# Patient Record
Sex: MALE | Race: WHITE | NOT HISPANIC OR LATINO | URBAN - METROPOLITAN AREA
[De-identification: names, ages, dates, MRNs, and addresses within clinical notes are randomized per-mention and may not be internally consistent; named-entity substitution may affect disease eponyms.]

---

## 2017-02-27 ENCOUNTER — OUTPATIENT (OUTPATIENT)
Dept: OUTPATIENT SERVICES | Facility: HOSPITAL | Age: 47
LOS: 1 days | Discharge: HOME | End: 2017-02-27
Payer: COMMERCIAL

## 2017-02-27 VITALS
DIASTOLIC BLOOD PRESSURE: 69 MMHG | RESPIRATION RATE: 20 BRPM | TEMPERATURE: 97 F | HEART RATE: 55 BPM | OXYGEN SATURATION: 96 % | SYSTOLIC BLOOD PRESSURE: 139 MMHG

## 2017-02-27 DIAGNOSIS — R94.39 ABNORMAL RESULT OF OTHER CARDIOVASCULAR FUNCTION STUDY: ICD-10-CM

## 2017-02-27 LAB
BUN SERPL-MCNC: 6 MG/DL — LOW (ref 7–23)
CALCIUM SERPL-MCNC: 9.2 MG/DL — SIGNIFICANT CHANGE UP (ref 8.4–10.5)
CHLORIDE SERPL-SCNC: 103 MMOL/L — SIGNIFICANT CHANGE UP (ref 98–107)
CO2 SERPL-SCNC: 26 MMOL/L — SIGNIFICANT CHANGE UP (ref 22–31)
CREAT SERPL-MCNC: 0.91 MG/DL — SIGNIFICANT CHANGE UP (ref 0.5–1.3)
GLUCOSE SERPL-MCNC: 103 MG/DL — HIGH (ref 70–99)
HBA1C BLD-MCNC: 6 % — HIGH (ref 4–5.6)
HCT VFR BLD CALC: 49.9 % — SIGNIFICANT CHANGE UP (ref 39–50)
HGB BLD-MCNC: 16.9 G/DL — SIGNIFICANT CHANGE UP (ref 13–17)
MCHC RBC-ENTMCNC: 31.2 PG — SIGNIFICANT CHANGE UP (ref 27–34)
MCHC RBC-ENTMCNC: 33.9 % — SIGNIFICANT CHANGE UP (ref 32–36)
MCV RBC AUTO: 92.2 FL — SIGNIFICANT CHANGE UP (ref 80–100)
PLATELET # BLD AUTO: 277 K/UL — SIGNIFICANT CHANGE UP (ref 150–400)
PMV BLD: 10.3 FL — SIGNIFICANT CHANGE UP (ref 7–13)
POTASSIUM SERPL-MCNC: 4.3 MMOL/L — SIGNIFICANT CHANGE UP (ref 3.5–5.3)
POTASSIUM SERPL-SCNC: 4.3 MMOL/L — SIGNIFICANT CHANGE UP (ref 3.5–5.3)
RBC # BLD: 5.41 M/UL — SIGNIFICANT CHANGE UP (ref 4.2–5.8)
RBC # FLD: 14.2 % — SIGNIFICANT CHANGE UP (ref 10.3–14.5)
SODIUM SERPL-SCNC: 140 MMOL/L — SIGNIFICANT CHANGE UP (ref 135–145)
WBC # BLD: 6.81 K/UL — SIGNIFICANT CHANGE UP (ref 3.8–10.5)
WBC # FLD AUTO: 6.81 K/UL — SIGNIFICANT CHANGE UP (ref 3.8–10.5)

## 2017-02-27 PROCEDURE — 93458 L HRT ARTERY/VENTRICLE ANGIO: CPT | Mod: 26

## 2017-02-27 PROCEDURE — 93010 ELECTROCARDIOGRAM REPORT: CPT

## 2017-02-27 RX ORDER — ATORVASTATIN CALCIUM 80 MG/1
1 TABLET, FILM COATED ORAL
Qty: 30 | Refills: 0 | OUTPATIENT
Start: 2017-02-27 | End: 2017-03-29

## 2017-02-27 RX ORDER — ASPIRIN/CALCIUM CARB/MAGNESIUM 324 MG
1 TABLET ORAL
Qty: 0 | Refills: 0 | COMMUNITY

## 2017-02-27 RX ORDER — SODIUM CHLORIDE 9 MG/ML
500 INJECTION INTRAMUSCULAR; INTRAVENOUS; SUBCUTANEOUS
Qty: 0 | Refills: 0 | Status: DISCONTINUED | OUTPATIENT
Start: 2017-02-27 | End: 2017-03-14

## 2017-02-27 RX ORDER — METOPROLOL TARTRATE 50 MG
1 TABLET ORAL
Qty: 0 | Refills: 0 | COMMUNITY

## 2017-02-27 RX ORDER — SODIUM CHLORIDE 9 MG/ML
3 INJECTION INTRAMUSCULAR; INTRAVENOUS; SUBCUTANEOUS EVERY 8 HOURS
Qty: 0 | Refills: 0 | Status: DISCONTINUED | OUTPATIENT
Start: 2017-02-27 | End: 2017-03-14

## 2017-02-27 RX ADMIN — SODIUM CHLORIDE 100 MILLILITER(S): 9 INJECTION INTRAMUSCULAR; INTRAVENOUS; SUBCUTANEOUS at 12:42

## 2017-02-27 NOTE — H&P CARDIOLOGY - HISTORY OF PRESENT ILLNESS
46 y.o.male presents today for elective cardiac catheterization. The patient c/o midsternal chest pain, felt like "chest tightness". Admits to fever, SOB. Denies palpitations, dizziness, b/l lower extremities edema. The patient went to urgent care, was diagnosed with bronchitis, started on anbx, also  was found to have abnormal ECG, was referred to a cardiologist, was found to have abnormal stress test, normal Echo. The patient was started on Metoprolol and was referred for cardiac catheterization. The patient finished course of antbs and claims that since that time he didn't have the recurrence of symptoms. The patient denies any complaints at present. 46 y.o.male presents today for elective cardiac catheterization. The patient c/o midsternal chest pain, felt like "chest tightness". Admits to fever, SOB. Denies palpitations, dizziness, b/l lower extremities edema. The patient went to urgent care, was diagnosed with bronchitis, started on anbx, also  was found to have abnormal ECG, was referred to a cardiologist, was found to have abnormal stress test, normal Echo. The patient was started on Metoprolol and was referred for cardiac catheterization. The patient finished course of antbs and claims that since that time he didn't have the recurrence of symptoms. The patient denies any complaints at present.     Stress test - mild inferior wall ischemia, as per Dr. Chowdhury's note

## 2017-02-27 NOTE — H&P CARDIOLOGY - REVIEW OF SYSTEMS
NO palpitations, diaphoresis, lightheadedness, dizziness, syncope, increased lowr extremity edema, fever chills, malaise, myalgias, anorexia, weight changes ( loss or gain), night sweats, generalized fatigue abdominal pain, N/V/C/D BRBPR, melena, urinary symptoms, cough, and wheezing.

## 2017-02-27 NOTE — H&P CARDIOLOGY - FAMILY HISTORY
Mother  Still living? Yes, Estimated age: Age Unknown  Family history of CLL (chronic lymphoid leukemia), Age at diagnosis: Age Unknown

## 2017-05-08 ENCOUNTER — APPOINTMENT (OUTPATIENT)
Dept: ORTHOPEDIC SURGERY | Facility: CLINIC | Age: 47
End: 2017-05-08

## 2017-05-08 VITALS
SYSTOLIC BLOOD PRESSURE: 120 MMHG | DIASTOLIC BLOOD PRESSURE: 80 MMHG | WEIGHT: 200 LBS | HEIGHT: 72 IN | HEART RATE: 65 BPM | BODY MASS INDEX: 27.09 KG/M2

## 2017-05-08 DIAGNOSIS — M54.12 RADICULOPATHY, CERVICAL REGION: ICD-10-CM

## 2018-03-04 NOTE — H&P CARDIOLOGY - RESPIRATORY
Please drink plenty of fluids.  Please get plenty of rest.  Please return here or go to the Emergency Department for any concerns or worsening of condition.  If you were prescribed antibiotics, please take them to completion.  If you were given wait & see antibiotics, please wait 5-7 days before taking them, and only take them if your symptoms have worsened or not improved.  If you do begin taking the antibiotics, please take them to completion.  If you were prescribed a narcotic medication, do not drive or operate heavy equipment or machinery while taking these medications.  If you were given a steroid shot in the clinic and have also been given a prescription for a steroid such as Prednisone or a Medrol Dose Pack, please begin taking them tomorrow.  If you do not have Hypertension or any history of palpitations, it is ok to take over the counter Sudafed or Mucinex D or Allegra-D or Claritin-D or Zyrtec-D.  If you do take one of the above, it is ok to combine that with plain over the counter Mucinex or Allegra or Claritin or Zyrtec.  If for example you are taking Zyrtec -D, you can combine that with Mucinex, but not Mucinex-D.  If you are taking Mucinex-D, you can combine that with plain Allegra or Claritin or Zyrtec.   If you do have Hypertension or palpitations, it is safe to take Coricidin HBP for relief of sinus symptoms.  If not allergic, please take over the counter Tylenol (Acetaminophen) and/or Motrin (Ibuprofen) as directed for control of pain and/or fever.  Please follow up with your primary care doctor or specialist as needed.    If you  smoke, please stop smoking.  Viral Upper Respiratory Illness (Adult)  You have a viral upper respiratory illness (URI), which is another term for the common cold. This illness is contagious during the first few days. It is spread through the air by coughing and sneezing. It may also be spread by direct contact (touching the sick person and then touching your own eyes,  nose, or mouth). Frequent handwashing will decrease risk of spread. Most viral illnesses go away within 7 to 10 days with rest and simple home remedies. Sometimes the illness may last for several weeks. Antibiotics will not kill a virus, and they are generally not prescribed for this condition.    Home care  · If symptoms are severe, rest at home for the first 2 to 3 days. When you resume activity, don't let yourself get too tired.  · Avoid being exposed to cigarette smoke (yours or others).  · You may use acetaminophen or ibuprofen to control pain and fever, unless another medicine was prescribed. (Note: If you have chronic liver or kidney disease, have ever had a stomach ulcer or gastrointestinal bleeding, or are taking blood-thinning medicines, talk with your healthcare provider before using these medicines.) Aspirin should never be given to anyone under 18 years of age who is ill with a viral infection or fever. It may cause severe liver or brain damage.  · Your appetite may be poor, so a light diet is fine. Avoid dehydration by drinking 6 to 8 glasses of fluids per day (water, soft drinks, juices, tea, or soup). Extra fluids will help loosen secretions in the nose and lungs.  · Over-the-counter cold medicines will not shorten the length of time youre sick, but they may be helpful for the following symptoms: cough, sore throat, and nasal and sinus congestion. (Note: Do not use decongestants if you have high blood pressure.)  Follow-up care  Follow up with your healthcare provider, or as advised.  When to seek medical advice  Call your healthcare provider right away if any of these occur:  · Cough with lots of colored sputum (mucus)  · Severe headache; face, neck, or ear pain  · Difficulty swallowing due to throat pain  · Fever of 100.4°F (38°C)  Call 911, or get immediate medical care  Call emergency services right away if any of these occur:  · Chest pain, shortness of breath, wheezing, or difficulty  breathing  · Coughing up blood  · Inability to swallow due to throat pain  Date Last Reviewed: 9/13/2015  © 9355-6096 [employer]. [address] [place] [address], [place] [address]. All rights reserved. This information is not intended as a substitute for professional medical care. Always follow your healthcare professional's instructions.         Breath Sounds equal & clear to percussion & auscultation, no accessory muscle use

## 2019-02-18 ENCOUNTER — TRANSCRIPTION ENCOUNTER (OUTPATIENT)
Age: 49
End: 2019-02-18

## 2019-02-20 ENCOUNTER — APPOINTMENT (OUTPATIENT)
Dept: ORTHOPEDIC SURGERY | Facility: CLINIC | Age: 49
End: 2019-02-20
Payer: COMMERCIAL

## 2019-02-20 VITALS
BODY MASS INDEX: 24.38 KG/M2 | HEIGHT: 72 IN | WEIGHT: 180 LBS | SYSTOLIC BLOOD PRESSURE: 118 MMHG | HEART RATE: 84 BPM | DIASTOLIC BLOOD PRESSURE: 87 MMHG

## 2019-02-20 PROCEDURE — 99214 OFFICE O/P EST MOD 30 MIN: CPT | Mod: 25

## 2019-02-20 PROCEDURE — 97760 ORTHOTIC MGMT&TRAING 1ST ENC: CPT

## 2019-02-20 RX ORDER — AMOXICILLIN AND CLAVULANATE POTASSIUM 875; 125 MG/1; MG/1
875-125 TABLET, COATED ORAL
Qty: 14 | Refills: 0 | Status: ACTIVE | COMMUNITY
Start: 2018-11-16

## 2019-02-20 RX ORDER — AZITHROMYCIN 250 MG/1
250 TABLET, FILM COATED ORAL
Qty: 10 | Refills: 0 | Status: ACTIVE | COMMUNITY
Start: 2018-11-24

## 2019-02-20 RX ORDER — PREDNISONE 20 MG/1
20 TABLET ORAL
Qty: 8 | Refills: 0 | Status: ACTIVE | COMMUNITY
Start: 2018-11-16

## 2019-02-20 RX ORDER — DEXTROAMPHETAMINE SULFATE, DEXTROAMPHETAMINE SACCHARATE, AMPHETAMINE SULFATE AND AMPHETAMINE ASPARTATE 1.25; 1.25; 1.25; 1.25 MG/1; MG/1; MG/1; MG/1
5 CAPSULE, EXTENDED RELEASE ORAL
Qty: 30 | Refills: 0 | Status: ACTIVE | COMMUNITY
Start: 2018-11-08

## 2019-02-20 RX ORDER — LISDEXAMFETAMINE DIMESYLATE 60 MG/1
60 CAPSULE ORAL
Qty: 30 | Refills: 0 | Status: ACTIVE | COMMUNITY
Start: 2019-01-03

## 2019-02-20 RX ORDER — DEXTROAMPHETAMINE SULFATE, DEXTROAMPHETAMINE SACCHARATE, AMPHETAMINE SULFATE AND AMPHETAMINE ASPARTATE 2.5; 2.5; 2.5; 2.5 MG/1; MG/1; MG/1; MG/1
10 CAPSULE, EXTENDED RELEASE ORAL
Qty: 30 | Refills: 0 | Status: ACTIVE | COMMUNITY
Start: 2018-11-08

## 2019-02-20 RX ORDER — CEPHALEXIN 500 MG/1
500 CAPSULE ORAL
Qty: 14 | Refills: 0 | Status: ACTIVE | COMMUNITY
Start: 2019-02-18

## 2019-02-20 RX ORDER — LISDEXAMFETAMINE DIMESYLATE 70 MG/1
70 CAPSULE ORAL
Qty: 30 | Refills: 0 | Status: ACTIVE | COMMUNITY
Start: 2019-01-31

## 2019-02-20 RX ORDER — LISDEXAMFETAMINE DIMESYLATE 20 MG/1
20 CAPSULE ORAL
Qty: 30 | Refills: 0 | Status: ACTIVE | COMMUNITY
Start: 2018-12-06

## 2019-02-20 RX ORDER — BENZONATATE 200 MG/1
200 CAPSULE ORAL
Qty: 21 | Refills: 0 | Status: ACTIVE | COMMUNITY
Start: 2018-11-24

## 2019-02-20 RX ORDER — LISDEXAMFETAMINE DIMESYLATE 40 MG/1
40 CAPSULE ORAL
Qty: 21 | Refills: 0 | Status: ACTIVE | COMMUNITY
Start: 2018-12-13

## 2019-02-20 NOTE — ADDENDUM
[FreeTextEntry1] : I, Joslyn Eduardana rosa wrote this note acting as a scribe for Dr. Soren Adames on Feb 20, 2019.   All medical record entries made by the scribe were at my, Dr. Soren Adames, direction and personally dictated by me on 02/20/2019. I have reviewed the chart and agree that the record accurately reflects my personal performance of the history, physical exam, assessment and plan. I have also personally directed, reviewed, and agreed with the chart.

## 2019-02-20 NOTE — PHYSICAL EXAM
[de-identified] : General: Patient is alert and oriented x3. In no acute distress. Pleasant in nature with a normal affect. No apparent respiratory distress. \par \par Left Foot Exam\par Skin: Skin sloughing off the dorsum of the 3rd-5th metatarsals. Skin sloughing at the MTP, with swelling. Fracture blister at the tip of the big toe with minimal draining blood, noninfectious. Plantar suture is in place.\par Inspection: Edema present. No obvious malalignment, no masses, no effusion. \par Pulses: 2+ DP/PT pulses\par ROM: Not assessed. \par Tenderness: No tenderness over the medial/lateral malleolus, no CFL/ATFL/PTFL pain. No medial malleolus pain, no deltoid ligament pain. No heel pain. No Achilles tenderness. No 5th metatarsal pain. No pain to the LisFranc joint. No ttp over the posterior tibial tendon.\par Stability: Negative anterior/posterior drawer.\par Strength: 5/5 ADD/ABD/TA/GS/EHL/FHL/EDL\par Neuro: Sensation intact to light touch throughout\par Additional tests: Negative Block’s test, negative tarsal tunnel Tinel’s, negative single heel rise\par   [de-identified] : 3 views of the left foot were ordered and interpreted today, 02/20/2019  by Dr. Adames to show a transverse midpole distal phalanx fracture with 50% dorsal displacement in the big toe. \par \par \par

## 2019-02-20 NOTE — HISTORY OF PRESENT ILLNESS
[FreeTextEntry1] : Patient is a 48 year old male who presents today c/o left big toe pain present for 3 days after injuring it on 02/17/2019. The patient’s pain is noted to be a 6/10. Patient has REM sleep behavior disorder which causes him to act out his dream when he enters a deep REM cycle. 3x nights ago during such an episode of this, he got out of bed and kicked a wall. He was seen at [facility] the next day where xrays revealed a comminuted fracture through the midportion of the 1st toe distal phalanx with half shaft dorsal displacement. The area was cleaned with new dressing and he was started on a 10-day course of abx, the name of which he does not remember. He has 7 more days  left of it. The patient is an  for [employer].\par \par

## 2019-02-20 NOTE — DISCUSSION/SUMMARY
[de-identified] : Today, I had a lengthy discussion with the patient regarding their left foot pain. I have addressed all of the patient's concerns surrounding the pathology of their condition. I recommend firstly that the patient be out of work until further notice. He was provided with the proper medical documentation today to support this claim. Secondly, nonoperative and operative treatment options were reviewed with the patient including the possible risks and benefits of each. At this time, the patient and I both agree to hold off with surgical intervention in asa of natural healing. As such, I advise that the patient be fitted for a CAM boot to better protect the toe for possible impact and axial loading. He will be fitted for one prior to leaving the office today.  The patient's toe was wrapped in new dressing and he was advised to keep the area clean and dry until their next followup. I would like to see the patient back in this office in 1x week to assess for healing progression. In the interm he may use NSAIDs, ice and elevate the foot above the level of the heart. The patient understood and verbally agreed to the treatment plan. All of their questions were answered and they were satisfied with the visit. The patient should call the office if they have any questions or experience worsening symptoms.  \par  \par Abx\par Considered CRPP\par COncern for infection discussed\par will defer surgery at this time.\par re-eval in 1 week\par

## 2019-03-04 ENCOUNTER — APPOINTMENT (OUTPATIENT)
Dept: ORTHOPEDIC SURGERY | Facility: CLINIC | Age: 49
End: 2019-03-04
Payer: COMMERCIAL

## 2019-03-04 DIAGNOSIS — S92.422B: ICD-10-CM

## 2019-03-04 PROCEDURE — 73630 X-RAY EXAM OF FOOT: CPT | Mod: LT

## 2019-03-04 PROCEDURE — 99213 OFFICE O/P EST LOW 20 MIN: CPT

## 2019-03-04 NOTE — PROCEDURE
[FreeTextEntry1] : The patient's sutures were removed in the office today. He tolerated the procedure well with no resistance.

## 2019-03-04 NOTE — HISTORY OF PRESENT ILLNESS
[FreeTextEntry1] : 48 year year old male presenting with left foot pain. The patient’s pain is noted to be a 5/10. The patient describes their pain as 20% improved compared to their last visit. He describes his swelling as 50% improved compared to the last visit. He presents wearing a CAM boot. The patient notes he recently tried to ambulate without his boot. He notes that he has some pain walking without the boot, however, overall his pain is markedly improved. He is currently taking no pain medication. The patient notes that he recently completed his course of antibiotics. No other complaints at this time.

## 2019-03-04 NOTE — PHYSICAL EXAM
[de-identified] : General: Patient is alert and oriented x3. In no acute distress. Pleasant in nature with a normal affect. No apparent respiratory distress. \par \par Left Foot Exam\par Skin: Skin sloughing off the dorsum of the 3rd-5th metatarsals. Skin sloughing at the MTP, with swelling. Fracture blister at the tip of the big toe with minimal draining blood, noninfectious. Plantar suture is in place.\par Inspection: Edema present. No obvious malalignment, no masses, no effusion. \par Pulses: 2+ DP/PT pulses\par ROM: Not assessed. \par Tenderness: No tenderness over the medial/lateral malleolus, no CFL/ATFL/PTFL pain. No medial malleolus pain, no deltoid ligament pain. No heel pain. No Achilles tenderness. No 5th metatarsal pain. No pain to the LisFranc joint. No ttp over the posterior tibial tendon.\par Stability: Negative anterior/posterior drawer.\par Strength: 5/5 ADD/ABD/TA/GS/EHL/FHL/EDL\par Neuro: Sensation intact to light touch throughout\par Additional tests: Negative Block’s test, negative tarsal tunnel Tinel’s, negative single heel rise\par   [de-identified] : 3V of the L foot were ordered obtained and reviewed by me today, 03/04/2019 , revealed: healing of transverse midpole distal phalanx fracture\par \par \par \par \par

## 2019-03-04 NOTE — DISCUSSION/SUMMARY
[de-identified] : Today I had a lengthy discussion with the patient regarding their left foot pain.I have addressed all the patient's concerns surrounding the pathology of their condition. I recommend that the patient's sutures be removed. The sutures were removed in the office today. The patient tolerated that procedure well. I advised the patient to utilize toe spacers. The patient was provided the toe spacers in the office today. At this time, the patient should not return to work until he transitions to a regular shoe. i advised the patient to attempt to utilize a wide/stiff shoe in 1 week if he feels comfortable doing so. The patient should call the office for clearance before attempting to return to work.  I would like to see the patient back in the office in 4 weeks to reassess their condition.The patient understood and verbally agreed to the treatment plan. All of their questions were answered and they were satisfied with the visit. The patient should call the office if they have any questions or experience worsening symptoms. \par \par Suture removal\par transition to stiff shoe with wide open toe box\par May return to work when able to transfer to appropriate shoe.

## 2019-03-04 NOTE — ADDENDUM
[FreeTextEntry1] : I, Luca Alarcon, acted solely as a scribe for Dr. Soren Adames on this date 03/04/2019  .\par  \par All medical record entries made by the Scribe were at my, Dr. Soren Adames, direction and personally dictated by me on 03/04/2019 . I have reviewed the chart and agree that the record accurately reflects my personal performance of the history, physical exam, assessment and plan. I have also personally directed, reviewed, and agreed with the chart.\par \par \par

## 2019-04-04 ENCOUNTER — APPOINTMENT (OUTPATIENT)
Dept: ORTHOPEDIC SURGERY | Facility: CLINIC | Age: 49
End: 2019-04-04
Payer: COMMERCIAL

## 2019-04-04 DIAGNOSIS — S92.422D DISPLACED FRACTURE OF DISTAL PHALANX OF LEFT GREAT TOE, SUBSEQUENT ENCOUNTER FOR FRACTURE WITH ROUTINE HEALING: ICD-10-CM

## 2019-04-04 DIAGNOSIS — S92.402A DISPLACED UNSPECIFIED FRACTURE OF LEFT GREAT TOE, INITIAL ENCOUNTER FOR CLOSED FRACTURE: ICD-10-CM

## 2019-04-04 PROCEDURE — 99213 OFFICE O/P EST LOW 20 MIN: CPT

## 2019-04-04 PROCEDURE — 73630 X-RAY EXAM OF FOOT: CPT | Mod: LT

## 2019-04-04 NOTE — PHYSICAL EXAM
[de-identified] : General: Alert and oriented x3. In no acute distress. Pleasant in nature with a normal affect. No apparent respiratory distress.\par \par Left Foot exam\par Skin: Clean, dry, intact\par Inspection: No obvious malalignment, no masses, no swelling, no effusion. Plantar tuft of the toe. No open wound. \par Pulses: 2+ DP/PT pulses\par ROM: FOOT full ROM of digits ANKLE 10  degrees of dorsiflexion, 40 degrees of plantarflexion, 10 degrees of subtalar motion.\par Painful ROM: None\par Tenderness: No tenderness over the medial/lateral malleolus, no CFL/ATFL/PTFL pain. No medial malleolus pain, no deltoid ligament pain. No heel pain. No Achilles tenderness. No 5th metatarsal pain. No pain to the LisFranc joint. No ttp over the posterior tibial tendon.\par Stability: Negative anterior/posterior drawer.\par Strength: 5/5 ADD/ABD/TA/GS/EHL/FHL/EDL\par Neuro: Sensation in tact to light touch throughout\par Additional tests: Negative Mortons test, negative tarsal tunnel tinels, negative single heel rise\par \par \par  \par \par \par \par \par \par \par   [de-identified] : 3V X-ray of left foot ordered, reviewed by me, revealed: bone healing noted with dorsal angulation

## 2019-04-04 NOTE — DISCUSSION/SUMMARY
[de-identified] : Today in the office I had a lengthy discussion with the patient regarding his left foot pain. I have addressed all of the patient's concern surrounding the pathology of their conditions. I advised the patient to utilize ice, NSAIDs PRN, and elevate his left foot above the level of their heart. I advised the patient that he no longer needs to use toe spacers. He can return to work as tolerated, though should be sure to wear a stiff toed shoe. The patient should contact the office immediately should they their pain worsen. Follow up with the office as needed. I received and answered all the patient's questions and they verbalized an understanding. The patient is in agreement with this treatment plan.

## 2019-04-04 NOTE — ADDENDUM
[FreeTextEntry1] : Documented by Raheem Shah, acting as a scribe for Dr. Adames on this date 04/04/2019 \par \par All medical record entries made by the Scriber were at my, Dr. Adames's direction and personally dictated by me on 04/04/2019. I have reviewed the chart and agree that the record accurately reflects my personal performance of the history, physical exam, assessment and plan. I have also personally directed, reviewed, and agreed with the chart.

## 2022-11-21 ENCOUNTER — NON-APPOINTMENT (OUTPATIENT)
Age: 52
End: 2022-11-21